# Patient Record
Sex: FEMALE | Race: WHITE | NOT HISPANIC OR LATINO | URBAN - METROPOLITAN AREA
[De-identification: names, ages, dates, MRNs, and addresses within clinical notes are randomized per-mention and may not be internally consistent; named-entity substitution may affect disease eponyms.]

---

## 2023-06-14 LAB
ALANINE AMINOTRANSFERASE (SGPT) (U/L) IN SER/PLAS: 22 U/L (ref 7–45)
ALBUMIN (G/DL) IN SER/PLAS: 4.7 G/DL (ref 3.4–5)
ALKALINE PHOSPHATASE (U/L) IN SER/PLAS: 76 U/L (ref 33–110)
ANION GAP IN SER/PLAS: 15 MMOL/L (ref 10–20)
ASPARTATE AMINOTRANSFERASE (SGOT) (U/L) IN SER/PLAS: 20 U/L (ref 9–39)
BILIRUBIN TOTAL (MG/DL) IN SER/PLAS: 0.7 MG/DL (ref 0–1.2)
CALCIUM (MG/DL) IN SER/PLAS: 10 MG/DL (ref 8.6–10.6)
CARBON DIOXIDE, TOTAL (MMOL/L) IN SER/PLAS: 27 MMOL/L (ref 21–32)
CHLORIDE (MMOL/L) IN SER/PLAS: 101 MMOL/L (ref 98–107)
CHOLESTEROL (MG/DL) IN SER/PLAS: 197 MG/DL (ref 0–199)
CHOLESTEROL IN HDL (MG/DL) IN SER/PLAS: 67.4 MG/DL
CHOLESTEROL/HDL RATIO: 2.9
CREATININE (MG/DL) IN SER/PLAS: 0.77 MG/DL (ref 0.5–1.05)
ERYTHROCYTE DISTRIBUTION WIDTH (RATIO) BY AUTOMATED COUNT: 12.3 % (ref 11.5–14.5)
ERYTHROCYTE MEAN CORPUSCULAR HEMOGLOBIN CONCENTRATION (G/DL) BY AUTOMATED: 32.5 G/DL (ref 32–36)
ERYTHROCYTE MEAN CORPUSCULAR VOLUME (FL) BY AUTOMATED COUNT: 90 FL (ref 80–100)
ERYTHROCYTES (10*6/UL) IN BLOOD BY AUTOMATED COUNT: 5.02 X10E12/L (ref 4–5.2)
GFR FEMALE: >90 ML/MIN/1.73M2
GLUCOSE (MG/DL) IN SER/PLAS: 86 MG/DL (ref 74–99)
HEMATOCRIT (%) IN BLOOD BY AUTOMATED COUNT: 45.2 % (ref 36–46)
HEMOGLOBIN (G/DL) IN BLOOD: 14.7 G/DL (ref 12–16)
LDL: 111 MG/DL (ref 0–99)
LEUKOCYTES (10*3/UL) IN BLOOD BY AUTOMATED COUNT: 7 X10E9/L (ref 4.4–11.3)
NRBC (PER 100 WBCS) BY AUTOMATED COUNT: 0 /100 WBC (ref 0–0)
PLATELETS (10*3/UL) IN BLOOD AUTOMATED COUNT: 307 X10E9/L (ref 150–450)
POTASSIUM (MMOL/L) IN SER/PLAS: 4.4 MMOL/L (ref 3.5–5.3)
PROTEIN TOTAL: 7.1 G/DL (ref 6.4–8.2)
SODIUM (MMOL/L) IN SER/PLAS: 139 MMOL/L (ref 136–145)
THYROTROPIN (MIU/L) IN SER/PLAS BY DETECTION LIMIT <= 0.05 MIU/L: 1.37 MIU/L (ref 0.44–3.98)
THYROXINE (T4) (UG/DL) IN SER/PLAS: 7.9 UG/DL (ref 4.5–11.1)
TRIGLYCERIDE (MG/DL) IN SER/PLAS: 94 MG/DL (ref 0–149)
UREA NITROGEN (MG/DL) IN SER/PLAS: 11 MG/DL (ref 6–23)
VLDL: 19 MG/DL (ref 0–40)

## 2024-02-21 DIAGNOSIS — F41.9 ANXIETY: Primary | ICD-10-CM

## 2024-02-21 RX ORDER — MEDROXYPROGESTERONE ACETATE 10 MG/1
10 TABLET ORAL EVERY 24 HOURS
COMMUNITY
Start: 2023-05-01

## 2024-02-21 RX ORDER — ESCITALOPRAM OXALATE 5 MG/1
5 TABLET, FILM COATED ORAL DAILY
COMMUNITY
Start: 2022-06-08 | End: 2024-02-21 | Stop reason: SDUPTHER

## 2024-02-21 RX ORDER — ESCITALOPRAM OXALATE 5 MG/1
5 TABLET, FILM COATED ORAL DAILY
Qty: 90 TABLET | Refills: 1 | Status: SHIPPED | OUTPATIENT
Start: 2024-02-21

## 2024-02-21 RX ORDER — MULTIVITAMIN
1 TABLET ORAL DAILY
COMMUNITY

## 2024-02-21 RX ORDER — LEVONORGESTREL / ETHINYL ESTRADIOL AND ETHINYL ESTRADIOL 150-30(84)
1 KIT ORAL DAILY
COMMUNITY
Start: 2021-11-03

## 2024-06-19 ENCOUNTER — APPOINTMENT (OUTPATIENT)
Dept: PRIMARY CARE | Facility: CLINIC | Age: 29
End: 2024-06-19
Payer: COMMERCIAL

## 2024-06-19 VITALS
WEIGHT: 135.4 LBS | OXYGEN SATURATION: 99 % | HEIGHT: 62 IN | SYSTOLIC BLOOD PRESSURE: 138 MMHG | BODY MASS INDEX: 24.92 KG/M2 | DIASTOLIC BLOOD PRESSURE: 82 MMHG | HEART RATE: 102 BPM | TEMPERATURE: 97.7 F

## 2024-06-19 DIAGNOSIS — G43.909 MIGRAINE WITHOUT STATUS MIGRAINOSUS, NOT INTRACTABLE, UNSPECIFIED MIGRAINE TYPE: ICD-10-CM

## 2024-06-19 DIAGNOSIS — Z11.4 ENCOUNTER FOR SCREENING FOR HIV: ICD-10-CM

## 2024-06-19 DIAGNOSIS — Z11.59 NEED FOR HEPATITIS C SCREENING TEST: ICD-10-CM

## 2024-06-19 DIAGNOSIS — F41.9 ANXIETY: ICD-10-CM

## 2024-06-19 DIAGNOSIS — I47.19 ATRIAL PAROXYSMAL TACHYCARDIA (CMS-HCC): ICD-10-CM

## 2024-06-19 DIAGNOSIS — N97.9 INABILITY TO CONCEIVE, FEMALE: ICD-10-CM

## 2024-06-19 DIAGNOSIS — R79.89 HIGH SERUM LOW DENSITY LIPOPROTEIN (LDL) CHOLESTEROL: Primary | ICD-10-CM

## 2024-06-19 DIAGNOSIS — N92.6 IRREGULAR MENSTRUAL CYCLE: ICD-10-CM

## 2024-06-19 PROBLEM — G43.109 OCULAR MIGRAINE: Status: ACTIVE | Noted: 2024-06-19

## 2024-06-19 PROBLEM — L30.9 ECZEMA: Status: ACTIVE | Noted: 2024-06-19

## 2024-06-19 PROBLEM — E78.5 HYPERLIPIDEMIA: Status: RESOLVED | Noted: 2024-06-19 | Resolved: 2024-06-19

## 2024-06-19 PROBLEM — E78.5 HYPERLIPIDEMIA: Status: ACTIVE | Noted: 2024-06-19

## 2024-06-19 PROCEDURE — 99395 PREV VISIT EST AGE 18-39: CPT | Performed by: INTERNAL MEDICINE

## 2024-06-19 PROCEDURE — 1036F TOBACCO NON-USER: CPT | Performed by: INTERNAL MEDICINE

## 2024-06-19 RX ORDER — ESCITALOPRAM OXALATE 5 MG/1
5 TABLET ORAL DAILY
Qty: 90 TABLET | Refills: 3 | Status: SHIPPED | OUTPATIENT
Start: 2024-06-19

## 2024-06-19 SDOH — HEALTH STABILITY: PHYSICAL HEALTH: ON AVERAGE, HOW MANY MINUTES DO YOU ENGAGE IN EXERCISE AT THIS LEVEL?: 30 MIN

## 2024-06-19 SDOH — ECONOMIC STABILITY: TRANSPORTATION INSECURITY
IN THE PAST 12 MONTHS, HAS LACK OF TRANSPORTATION KEPT YOU FROM MEETINGS, WORK, OR FROM GETTING THINGS NEEDED FOR DAILY LIVING?: NO

## 2024-06-19 SDOH — HEALTH STABILITY: PHYSICAL HEALTH: ON AVERAGE, HOW MANY DAYS PER WEEK DO YOU ENGAGE IN MODERATE TO STRENUOUS EXERCISE (LIKE A BRISK WALK)?: 4 DAYS

## 2024-06-19 SDOH — ECONOMIC STABILITY: TRANSPORTATION INSECURITY
IN THE PAST 12 MONTHS, HAS THE LACK OF TRANSPORTATION KEPT YOU FROM MEDICAL APPOINTMENTS OR FROM GETTING MEDICATIONS?: NO

## 2024-06-19 ASSESSMENT — PATIENT HEALTH QUESTIONNAIRE - PHQ9
SUM OF ALL RESPONSES TO PHQ9 QUESTIONS 1 & 2: 0
2. FEELING DOWN, DEPRESSED OR HOPELESS: NOT AT ALL
1. LITTLE INTEREST OR PLEASURE IN DOING THINGS: NOT AT ALL

## 2024-06-19 ASSESSMENT — LIFESTYLE VARIABLES
HOW OFTEN DO YOU HAVE A DRINK CONTAINING ALCOHOL: NEVER
SKIP TO QUESTIONS 9-10: 1
HOW MANY STANDARD DRINKS CONTAINING ALCOHOL DO YOU HAVE ON A TYPICAL DAY: PATIENT DOES NOT DRINK
HOW OFTEN DO YOU HAVE SIX OR MORE DRINKS ON ONE OCCASION: NEVER
AUDIT-C TOTAL SCORE: 0

## 2024-06-19 ASSESSMENT — PAIN SCALES - GENERAL: PAINLEVEL: 0-NO PAIN

## 2024-06-19 NOTE — PROGRESS NOTES
"Chief Complaint   Patient presents with    Annual Exam     Pt here for AWV       28 y.o. for AWV  Last visit 06/2023. Home sbp around 130. Last checked about few months ago.   Palpitations ok.   Trying to conceive for 2 years. Irregular menses. More predictable last few cycles.   Did see Dr Malave-wanted to do a test but had to be times with menses. Menses irregular so makes it more difficult.       PAST MEDICAL HISTORY  Eczema.   Anxiety  Palpitations, tachycardia zio patch 2021  Migraines, ocular migraine (rare ocular migraine)      Allergy: wellbutrin, adverse reaction- palpitations     SOCIAL HISTORY  nonsmoker  . no children.   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     FAMILY HISTORY  mother alive Hashimoto's thyroidism, diverticulitis, basal cell ca.   Father alive depression.    Blood pressure 138/82, pulse 102, temperature 36.5 °C (97.7 °F), height 1.562 m (5' 1.5\"), weight 61.4 kg (135 lb 6.4 oz), SpO2 99%.  Body mass index is 25.17 kg/m².    Physical Examination  General: NAD. Alert.   HEENT: Normocephalic atraumatic.    Eyes: no scleral icterus. Extraocular movements intact.  Pupils equal round and reactive to light.  Ears: TM intact.  No cerumen. Hearing grossly intact.   Throat: No exudate  Neck:  Supple. No thyroid goiter.  Lymph nodes: No cervical or clavicular adenopathy  Cardiovascular: Regular rate rhythm S1-S2 normal no murmur. No carotid bruit.   Lungs: Clear to Auscultation without wheezing, rales, or rhonchi, Breath sounds symmetric. No use of accessory muscles  Abdomen:  Normoactive bowel sounds, soft, non-tender. No mass.Extremities: No pretibial edema  Neuro: no facial asymmetry.  Skin: no rash noted    No results found for: \"HGBA1C\"  Lab Results   Component Value Date    GLUCOSE 86 06/14/2023    CALCIUM 10.0 06/14/2023     06/14/2023    K 4.4 06/14/2023    CO2 27 06/14/2023     06/14/2023    BUN 11 06/14/2023    CREATININE " "0.77 06/14/2023     Lab Results   Component Value Date    ALT 22 06/14/2023    AST 20 06/14/2023    ALKPHOS 76 06/14/2023    BILITOT 0.7 06/14/2023     Lab Results   Component Value Date    WBC 7.0 06/14/2023    HGB 14.7 06/14/2023    HCT 45.2 06/14/2023    MCV 90 06/14/2023     06/14/2023     Lab Results   Component Value Date    CHOL 197 06/14/2023     Lab Results   Component Value Date    HDL 67.4 06/14/2023     No results found for: \"LDLCALC\"  Lab Results   Component Value Date    TRIG 94 06/14/2023     No components found for: \"CHOLHDL\"      ASSESSMENT/PLAN  AWV  Living Will: advised.   Cognition/Memory if 65 or above n/a  Hepatitis C (18-79) ordered  HIV (18-64, once)  ordered  Women: mammogram: n/a  PAP per gyn Dr Malave  Dexa: start age 65  Colon cancer screening 45 and older: n/a  Immunization: recalls tdap within last 1-2 years.   Depression:  denies    1. High serum low density lipoprotein (LDL) cholesterol  - Comprehensive Metabolic Panel; Future  - Lipid Panel; Future    2. Irregular menstrual cycle  - Referral to Reproductive Endocrinology; Future    3. Inability to conceive, female  - Referral to Reproductive Endocrinology; Future    4. Migraine without status migrainosus, not intractable, unspecified migraine type  - Comprehensive Metabolic Panel; Future  - CBC; Future    5. Need for hepatitis C screening test  - Hepatitis C antibody; Future    6. Encounter for screening for HIV  - HIV 1/2 Antigen/Antibody Screen with Reflex to Confirmation; Future    7. Atrial paroxysmal tachycardia (CMS-HCC)  Denies symptoms  - Comprehensive Metabolic Panel; Future  - CBC; Future    8. Anxiety  Controlled on medication  - escitalopram (Lexapro) 5 mg tablet; Take 1 tablet (5 mg) by mouth once daily.  Dispense: 90 tablet; Refill: 3        Current Outpatient Medications on File Prior to Visit   Medication Sig Dispense Refill    cetirizine (ZyrTEC) 10 mg capsule Take 1 capsule (10 mg) by mouth once daily.   "    escitalopram (Lexapro) 5 mg tablet TAKE 1 TABLET BY MOUTH EVERY DAY 90 tablet 1    multivitamin (Daily Multi-Vitamin) tablet Take 1 tablet by mouth once daily.      [DISCONTINUED] L norgest/e.estradioL-e.estrad (Seasonique) 0.15 mg-30 mcg (84)/10 mcg (7) tablets,dose pack,3 month tablet Take 1 tablet by mouth once daily.      [DISCONTINUED] Lexapro 5 mg tablet Take 1 tablet (5 mg) by mouth once daily. (Patient not taking: Reported on 6/19/2024) 90 tablet 1    [DISCONTINUED] Provera 10 mg tablet Take 1 tablet (10 mg) by mouth once every 24 hours.       No current facility-administered medications on file prior to visit.

## 2024-09-06 ENCOUNTER — LAB (OUTPATIENT)
Dept: LAB | Facility: LAB | Age: 29
End: 2024-09-06
Payer: COMMERCIAL

## 2024-09-06 DIAGNOSIS — Z11.59 NEED FOR HEPATITIS C SCREENING TEST: ICD-10-CM

## 2024-09-06 DIAGNOSIS — G43.909 MIGRAINE WITHOUT STATUS MIGRAINOSUS, NOT INTRACTABLE, UNSPECIFIED MIGRAINE TYPE: ICD-10-CM

## 2024-09-06 DIAGNOSIS — Z11.4 ENCOUNTER FOR SCREENING FOR HIV: ICD-10-CM

## 2024-09-06 DIAGNOSIS — I47.19 ATRIAL PAROXYSMAL TACHYCARDIA (CMS-HCC): ICD-10-CM

## 2024-09-06 DIAGNOSIS — R79.89 HIGH SERUM LOW DENSITY LIPOPROTEIN (LDL) CHOLESTEROL: ICD-10-CM

## 2024-09-06 LAB
ALBUMIN SERPL BCP-MCNC: 4.3 G/DL (ref 3.4–5)
ALP SERPL-CCNC: 60 U/L (ref 33–110)
ALT SERPL W P-5'-P-CCNC: 15 U/L (ref 7–45)
ANION GAP SERPL CALC-SCNC: 9 MMOL/L (ref 10–20)
AST SERPL W P-5'-P-CCNC: 14 U/L (ref 9–39)
BILIRUB SERPL-MCNC: 0.6 MG/DL (ref 0–1.2)
BUN SERPL-MCNC: 11 MG/DL (ref 6–23)
CALCIUM SERPL-MCNC: 9.1 MG/DL (ref 8.6–10.3)
CHLORIDE SERPL-SCNC: 103 MMOL/L (ref 98–107)
CHOLEST SERPL-MCNC: 164 MG/DL (ref 0–199)
CHOLESTEROL/HDL RATIO: 2.8
CO2 SERPL-SCNC: 29 MMOL/L (ref 21–32)
CREAT SERPL-MCNC: 0.77 MG/DL (ref 0.5–1.05)
EGFRCR SERPLBLD CKD-EPI 2021: >90 ML/MIN/1.73M*2
ERYTHROCYTE [DISTWIDTH] IN BLOOD BY AUTOMATED COUNT: 12 % (ref 11.5–14.5)
GLUCOSE SERPL-MCNC: 98 MG/DL (ref 74–99)
HCT VFR BLD AUTO: 41.2 % (ref 36–46)
HCV AB SER QL: NONREACTIVE
HDLC SERPL-MCNC: 57.6 MG/DL
HGB BLD-MCNC: 13.6 G/DL (ref 12–16)
HIV 1+2 AB+HIV1 P24 AG SERPL QL IA: NONREACTIVE
LDLC SERPL CALC-MCNC: 96 MG/DL
MCH RBC QN AUTO: 29.6 PG (ref 26–34)
MCHC RBC AUTO-ENTMCNC: 33 G/DL (ref 32–36)
MCV RBC AUTO: 90 FL (ref 80–100)
NON HDL CHOLESTEROL: 106 MG/DL (ref 0–149)
NRBC BLD-RTO: 0 /100 WBCS (ref 0–0)
PLATELET # BLD AUTO: 284 X10*3/UL (ref 150–450)
POTASSIUM SERPL-SCNC: 4.5 MMOL/L (ref 3.5–5.3)
PROT SERPL-MCNC: 6.8 G/DL (ref 6.4–8.2)
RBC # BLD AUTO: 4.6 X10*6/UL (ref 4–5.2)
SODIUM SERPL-SCNC: 136 MMOL/L (ref 136–145)
TRIGL SERPL-MCNC: 52 MG/DL (ref 0–149)
VLDL: 10 MG/DL (ref 0–40)
WBC # BLD AUTO: 7.8 X10*3/UL (ref 4.4–11.3)

## 2024-09-06 PROCEDURE — 80061 LIPID PANEL: CPT

## 2024-09-06 PROCEDURE — 85027 COMPLETE CBC AUTOMATED: CPT

## 2024-09-06 PROCEDURE — 36415 COLL VENOUS BLD VENIPUNCTURE: CPT

## 2024-09-06 PROCEDURE — 87389 HIV-1 AG W/HIV-1&-2 AB AG IA: CPT

## 2024-09-06 PROCEDURE — 86803 HEPATITIS C AB TEST: CPT

## 2024-09-06 PROCEDURE — 80053 COMPREHEN METABOLIC PANEL: CPT

## 2024-09-10 ENCOUNTER — TELEPHONE (OUTPATIENT)
Dept: PRIMARY CARE | Facility: CLINIC | Age: 29
End: 2024-09-10
Payer: COMMERCIAL

## 2024-09-10 NOTE — TELEPHONE ENCOUNTER
Called pt and left detailed message regarding results.  Encouraged to contact clinical with any questions.

## 2024-09-10 NOTE — TELEPHONE ENCOUNTER
----- Message from Malaika Mabry sent at 9/9/2024  5:13 PM EDT -----  Cholesterol 164. Goal is 200 or less. Rest of labs within goal.

## 2024-09-26 ENCOUNTER — TELEPHONE (OUTPATIENT)
Dept: ENDOCRINOLOGY | Facility: CLINIC | Age: 29
End: 2024-09-26
Payer: COMMERCIAL

## 2024-09-26 ASSESSMENT — LIFESTYLE VARIABLES
TOBACCO_USE: NO
HISTORY_ALCOHOL_USE: NO

## 2024-09-26 NOTE — TELEPHONE ENCOUNTER
I called patient to see if she could set her Mychart up. She didn't answer but I did leave her a voicemail. I stated that I would need her to set her mychart up so that we can send her a questionnaire for her appointment and can send lab results.        Gini Cordon MA

## 2024-09-27 ENCOUNTER — ANCILLARY PROCEDURE (OUTPATIENT)
Dept: ENDOCRINOLOGY | Facility: CLINIC | Age: 29
End: 2024-09-27
Payer: COMMERCIAL

## 2024-09-27 ENCOUNTER — PATIENT MESSAGE (OUTPATIENT)
Dept: ENDOCRINOLOGY | Facility: CLINIC | Age: 29
End: 2024-09-27
Payer: COMMERCIAL

## 2024-09-27 ENCOUNTER — CONSULT (OUTPATIENT)
Dept: ENDOCRINOLOGY | Facility: CLINIC | Age: 29
End: 2024-09-27
Payer: COMMERCIAL

## 2024-09-27 VITALS
SYSTOLIC BLOOD PRESSURE: 118 MMHG | BODY MASS INDEX: 25.54 KG/M2 | WEIGHT: 135.25 LBS | HEART RATE: 99 BPM | DIASTOLIC BLOOD PRESSURE: 80 MMHG | HEIGHT: 61 IN

## 2024-09-27 DIAGNOSIS — Z11.3 SCREENING FOR STDS (SEXUALLY TRANSMITTED DISEASES): ICD-10-CM

## 2024-09-27 DIAGNOSIS — Z31.41 FERTILITY TESTING: ICD-10-CM

## 2024-09-27 DIAGNOSIS — Z01.812 ENCOUNTER FOR PREPROCEDURAL LABORATORY EXAMINATION: ICD-10-CM

## 2024-09-27 DIAGNOSIS — Z11.59 ENCOUNTER FOR SCREENING FOR OTHER VIRAL DISEASES: ICD-10-CM

## 2024-09-27 DIAGNOSIS — Z01.83 ENCOUNTER FOR RH BLOOD TYPING: ICD-10-CM

## 2024-09-27 DIAGNOSIS — N97.9 FEMALE INFERTILITY: Primary | ICD-10-CM

## 2024-09-27 DIAGNOSIS — N97.9 INABILITY TO CONCEIVE, FEMALE: ICD-10-CM

## 2024-09-27 DIAGNOSIS — N92.6 IRREGULAR MENSTRUAL CYCLE: ICD-10-CM

## 2024-09-27 DIAGNOSIS — Z13.29 SCREENING FOR THYROID DISORDER: ICD-10-CM

## 2024-09-27 DIAGNOSIS — Z13.71 SCREENING FOR GENETIC DISEASE CARRIER STATUS: ICD-10-CM

## 2024-09-27 LAB
ABO GROUP (TYPE) IN BLOOD: NORMAL
ANTIBODY SCREEN: NORMAL
HBV SURFACE AG SERPL QL IA: NONREACTIVE
RH FACTOR (ANTIGEN D): NORMAL
TSH SERPL-ACNC: 1.48 MIU/L (ref 0.44–3.98)

## 2024-09-27 PROCEDURE — 84443 ASSAY THYROID STIM HORMONE: CPT

## 2024-09-27 PROCEDURE — 86900 BLOOD TYPING SEROLOGIC ABO: CPT

## 2024-09-27 PROCEDURE — 86901 BLOOD TYPING SEROLOGIC RH(D): CPT

## 2024-09-27 PROCEDURE — 87340 HEPATITIS B SURFACE AG IA: CPT

## 2024-09-27 PROCEDURE — 86787 VARICELLA-ZOSTER ANTIBODY: CPT

## 2024-09-27 PROCEDURE — 83516 IMMUNOASSAY NONANTIBODY: CPT

## 2024-09-27 PROCEDURE — 86317 IMMUNOASSAY INFECTIOUS AGENT: CPT

## 2024-09-27 PROCEDURE — 99204 OFFICE O/P NEW MOD 45 MIN: CPT

## 2024-09-27 PROCEDURE — 87591 N.GONORRHOEAE DNA AMP PROB: CPT

## 2024-09-27 PROCEDURE — 86850 RBC ANTIBODY SCREEN: CPT

## 2024-09-27 PROCEDURE — 87491 CHLMYD TRACH DNA AMP PROBE: CPT

## 2024-09-27 PROCEDURE — 86780 TREPONEMA PALLIDUM: CPT

## 2024-09-27 PROCEDURE — 99214 OFFICE O/P EST MOD 30 MIN: CPT

## 2024-09-27 ASSESSMENT — PATIENT HEALTH QUESTIONNAIRE - PHQ9
1. LITTLE INTEREST OR PLEASURE IN DOING THINGS: NOT AT ALL
2. FEELING DOWN, DEPRESSED OR HOPELESS: NOT AT ALL
SUM OF ALL RESPONSES TO PHQ9 QUESTIONS 1 AND 2: 0

## 2024-09-27 ASSESSMENT — COLUMBIA-SUICIDE SEVERITY RATING SCALE - C-SSRS
1. IN THE PAST MONTH, HAVE YOU WISHED YOU WERE DEAD OR WISHED YOU COULD GO TO SLEEP AND NOT WAKE UP?: NO
2. HAVE YOU ACTUALLY HAD ANY THOUGHTS OF KILLING YOURSELF?: NO
6. HAVE YOU EVER DONE ANYTHING, STARTED TO DO ANYTHING, OR PREPARED TO DO ANYTHING TO END YOUR LIFE?: NO

## 2024-09-27 ASSESSMENT — LIFESTYLE VARIABLES
TOBACCO_USE: NO
HISTORY_ALCOHOL_USE: NO

## 2024-09-27 ASSESSMENT — PAIN SCALES - GENERAL: PAINLEVEL: 0-NO PAIN

## 2024-09-27 NOTE — PROGRESS NOTES
Visit Type: In Person    NEW FERTILITY PATIENT VISIT    Referred by: Primary Doctor, Malaika Huntley    Accompanied today by: N/A     Joie Craig is a 28 y.o.  female who presents with primary infertility x 2.5 yrs, possible male factor and spouse with ED, wants to pursue fertility testing and treatment.        Have you had any concerns about your fertility treatments so far? Only had basic bloodwork done so far. OBGYN recommended hysterosalpingogram but when I called to schedule was told that the hospital didn't perform that test any more.     What are you goals for today's visit? Have been trying to conceive for about 2.5 years with no success. Would like figure out what tests need to be done for me or my  (he's unable to come today).     What causes of infertility have been identified on your workup so far? Irregular periods (though now more regular than prior). Not thru workup but  unable to ejaculate     Past Infertility Treatments: No      Please summarize your fertility treatments to date.       As far as you are aware, do you have insurance coverage for fertility diagnostic testing and/or fertility treatments? Yes      PRIOR EVALUATION / TREATMENT: no    Hysterosalpingogram: no  Saline Infused Sonography: no  GYN Pelvic Ultrasound: no    Prior Labs  Lab Results    Date Done      AMH: No results found for requested labs within last 1825 days. No results found for requested labs within last 1825 days.   TSH: 1.37 (Ref range: 0.44 - 3.98 mIU/L) 2023   PRL: No results found for requested labs within last 1825 days. No results found for requested labs within last 1825 days.   Testosterone: No results found for requested labs within last 1825 days. No results found for requested labs within last 1825 days.   DHEAS: No results found for requested labs within last 1825 days. No results found for requested labs within last 1825 days.   FSH: No results found for requested labs within  last 1825 days. No results found for requested labs within last 1825 days.   17 OHP: No results found for requested labs within last 1825 days. No results found for requested labs within last 1825 days.   HgbA1c: No results found for requested labs within last 1825 days. No results found for requested labs within last 1825 days.   Hepatitis B surface antigen: No results found for requested labs within last 1825 days. No results found for requested labs within last 1825 days.   Hepatitis C antibody: Nonreactive (Ref range: Nonreactive) 9/6/2024   HIV ½ Antigen Antibody screen with reflex: Nonreactive (Ref range: Nonreactive) 9/6/2024   Syphilis screening with reflex: No results found for requested labs within last 1825 days. No results found for requested labs within last 1825 days.   GC: No results found for requested labs within last 1825 days. No results found for requested labs within last 1825 days.   CT: No results found for requested labs within last 1825 days. No results found for requested labs within last 1825 days.   Type and Screen: No results found for requested labs within last 1825 days. No results found for requested labs within last 1825 days.   Rh: No results found for requested labs within last 1825 days. No results found for requested labs within last 1825 days.   Antibody: No results found for requested labs within last 1825 days. No results found for requested labs within last 1825 days.   Rubella: No results found for requested labs within last 1825 days. No results found for requested labs within last 1825 days.   Varicella: No results found for requested labs within last 1825 days. No results found for requested labs within last 1825 days.   Hemoglobin: No results found for requested labs within last 1825 days. No results found for requested labs within last 1825 days.   Hematocrit: No results found for requested labs within last 1825 days. No results found for requested labs within last 1825  days.   Creatinine: 0.77 (Ref range: 0.50 - 1.05 mg/dL) 2024   AST:14 (Ref range: 9 - 39 U/L) 2024   ALT:15 (Ref range: 7 - 45 U/L): 2024     Relationship Status:  , 6 yrs    Have you ever been pregnant? No  How many times have you been pregnant?    Have you ever had a miscarriage? No  How many times have you had a miscarriage?       OB Hx:      OB History          0    Para   0    Term   0       0    AB   0    Living   0         SAB   0    IAB   0    Ectopic   0    Multiple   0    Live Births   0                 GYN HISTORY   Have you ever been diagnosed with a sexually transmitted disease? No  Have you ever had Pelvic Inflammatory Disease? No  Have you had an abnormal PAP smear? No  Date & Result of last PAP smear: 2021- negative  Have you ever had an abnormal Mammogram? No  Date & result of your last mammogram:  NA  Do you have pelvic pain? No  How many times per week do you have intercourse? 1-2 times during fertile window  Do you have pain with intercourse? No  Do you use lubricants with intercourse? None  Do you have pain with bowel movements? No  Do you have pain with a full bladder? No    MENSTRUAL HISTORY  LMP: 2024  Menarche: ~age 11  Contraception: OCP for heavy menses as teen x 8 yrs, stopped due to migraines with aura  Cycle length: every 31  Describe your bleedin days Average  Dysmenorrhea: no     ENDOCRINE/INFERTILITY HISTORY  Duration of infertility: 2.5 yrs  Coital Activity/week: 1-2 times during fertile window  Nipple Discharge: No  Vision changes: No  Headaches: yes-  had migraines with aura on OCPs, has had some HA's off OCP also, but not with aura, Excess hair growth: No  Excessive hair loss: No  Acne: No  Oily skin: No  Recent weight change  Weight gain: No  Weight loss: No  Exercise more than 3 times a week: No      PMH  Past Medical History:   Diagnosis Date    Anxiety     Irregular menstrual cycle     Migraines         MEDICATIONS  Current  Outpatient Medications on File Prior to Visit   Medication Sig Dispense Refill    cetirizine (ZyrTEC) 10 mg capsule Take 1 capsule (10 mg) by mouth once daily.      escitalopram (Lexapro) 5 mg tablet Take 1 tablet (5 mg) by mouth once daily. 90 tablet 3    multivitamin (Daily Multi-Vitamin) tablet Take 1 tablet by mouth once daily.       No current facility-administered medications on file prior to visit.        PSH  Past Surgical History:   Procedure Laterality Date    OTHER SURGICAL HISTORY  2021    [place] tooth extraction        PSYCH HISTORY  Have you ever been diagnosed with a mental health Issue? Yes  Have you ever been hospitalized for a mental health disorder? 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 No  Do you have a history of domestic violence? No  Do you feel safe at home? Yes  Do you have a history of any negative sexual experience such as incest or rape? No       PARTNER HISTORY  Partner Name: Chapo Craig  Partner : [id number]  Partner email: [email or web address]  Occupation:   Prior fertility history: No children. Decreased ability to be sexually stimulated. Difficult and usually unable to ejaculate. Difficulty maintaining erection. Inability to orgasm. Decreased sex drive.  Decreased pleasurable sensation of penis during sex with unable to ejaculate.  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    CANCER HISTORY    Breast: No  Ovarian: No  Colon: No  Endometrial: No    FAMILY VTE HISTORY  Family History of Blood Clots: No    GENETIC HISTORY  Ethnic Background  Patient: White  Partner: White  Genetic Disease in Family  Patient: No  Partner: No  Birth Defects in Family  Patient: No  Partner: No    Genetic screening performed previously:  no     BMI:   BMI Readings from Last 1 Encounters:   24 25.56 kg/m²     VITALS:  /80   Pulse 99   Ht 1.549 m (5' 1\")   Wt 61.3 kg (135 lb 4 oz)   LMP 2024   BMI 25.56 kg/m²     ASSESSMENT   28 y.o.  female with  primary infertility x 2.5, suspected ovulation and the following pertinent medical issues: male factor, spouse with ED, no formal urology evaluation done .    Partner SA: No Assessment    COUNSELING  We discussed causes of infertility including hormonal, egg quality issues, structural problems such as endometriosis, adhesions, or tubal problems, uterine factors such as polyps or fibroids, and sperm issues. Reviewed evaluation of such as well. We discussed various methods for achieving pregnancy in some detail including, ovulation induction, insemination, superovulation and IVF.    We discussed AMH testing and the patient's AMH level, if applicable.  AMH is considered favorable if >1 however this test has many limitations including poor predictive rates of pregnancy. In randomized control trials such as \"Time to conceive\" pts with low AMH levels (<0.7) has similar cumulative pregnancy rates compared with women that had normal AMH levels.  In the \"AMIGOS\" study, AMH did not predict pregnancy rates following OS/IUI for unexplained infertility. However,  AMH is a marker that is helpful to predict how a patient may respond or oocyte yields with FSH and ART treatments, but again there is conflicting data about how " this affects pregnancy rates with ART.    Routine Testing  Fertility Center  STDs Within 1 year   Genetic carrier Waiver/Completed   T&S Within 1 year   AMH Within 1 year   TSH Within 1 year   Rubella/Varicella Within 5 years     PLAN  AMH  T&S  TSH  STDs  Rubella/Varicella  [facility]  Pelvic Ultrasound- call CD 1 next menses to schedule  HSG- call CD 1 next menses to schedule  Schedule IVF Consult  Chart to primary nurse for care coordination and patient check list/education  Enroll in Engaged MD  start prenatal vitamins, vitamin D 2000 IUs daily  Discussed that pap and mammogram must be updated per ACOG guidelines before treatment can begin- schedule pap test, due now  Discussed that treatment cannot proceed until checklist items are complete   6- 8 virtual visit with me   Additional testing for BMI < 18 or > 40: NA    MD Completion:  Ectopic Risk: No  Medically Complex: No    Partner:  Will see Dr. COPELAND  Will need virtual NPV if partner is a carrier for Myriad  Will need STDs for IUI or IVF, likely will need IVF- may be able to have DR. COPELAND order  Cannot ejaculate, will refer to DR. COPELAND & for SA evaluation    FOLLOW UP PLAN:   Will likely need IVF  Did briefly discuss donor sperm for donor IUI vs IVF with partner or if no sperm then donor sperm- unsure if they would use donor sperm    María Elena Nino CNP 09/27/24 10:58 AM

## 2024-09-28 LAB
C TRACH RRNA SPEC QL NAA+PROBE: NEGATIVE
N GONORRHOEA DNA SPEC QL PROBE+SIG AMP: NEGATIVE
RUBV IGG SERPL IA-ACNC: 1.4 IA
RUBV IGG SERPL QL IA: POSITIVE
TREPONEMA PALLIDUM IGG+IGM AB [PRESENCE] IN SERUM OR PLASMA BY IMMUNOASSAY: NONREACTIVE
VARICELLA ZOSTER IGG INDEX: 1.7 IA
VZV IGG SER QL IA: POSITIVE

## 2024-09-30 LAB — MIS SERPL-MCNC: 7.01 NG/ML (ref 0.18–11.71)

## 2024-10-10 ENCOUNTER — DOCUMENTATION (OUTPATIENT)
Dept: ENDOCRINOLOGY | Facility: CLINIC | Age: 29
End: 2024-10-10
Payer: COMMERCIAL

## 2024-10-10 NOTE — PROGRESS NOTES
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 Carriers generally do not experience symptoms. G869R may be associated with a late-onset form of Kristofer disease.      [   ] Genetic carrier testing reviewed and NEGATIVE result noted.    Additional actions:  [   ] Ok to proceed with next steps, no additional genetic testing or counseling recommended  [ X  ] Awaiting partner testing: spouse Chapo Craig  1994 needs virtual NPV to order Myriad screening for him  [   ] Partner testing reviewed and no concordance. Ok to proceed with planned treatments.     María Elena Nino CNP 10/10/24 1:11 AM

## 2024-10-11 LAB — COMMENTS - MP RESULT TYPE: NORMAL

## 2024-10-18 ENCOUNTER — ANCILLARY PROCEDURE (OUTPATIENT)
Dept: ENDOCRINOLOGY | Facility: CLINIC | Age: 29
End: 2024-10-18
Payer: COMMERCIAL

## 2024-10-18 DIAGNOSIS — Z31.41 FERTILITY TESTING: ICD-10-CM

## 2024-10-18 PROCEDURE — 76830 TRANSVAGINAL US NON-OB: CPT

## 2024-10-18 PROCEDURE — 76830 TRANSVAGINAL US NON-OB: CPT | Performed by: OBSTETRICS & GYNECOLOGY

## 2024-11-21 ENCOUNTER — DOCUMENTATION (OUTPATIENT)
Dept: ENDOCRINOLOGY | Facility: CLINIC | Age: 29
End: 2024-11-21
Payer: COMMERCIAL

## 2024-11-21 NOTE — PROGRESS NOTES
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 Carriers generally do not experience symptoms. G869R may be associated with a late-onset form of Kristofer disease. Spouse is negative.     [   ] Genetic carrier testing reviewed and NEGATIVE result noted.     Additional actions:  [   ] Ok to proceed with next steps, no additional genetic testing or counseling recommended  [  ] Awaiting partner testing:  [  X ] Partner testing reviewed and no concordance. Ok to proceed with planned treatments. 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 Carriers generally do not experience symptoms. 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 Carriers generally do not experience symptoms. In the absence of a known personal and/or family history of inflammatory disease, the clinical significance of MEFV mutation status is uncertain. Spouse is negative.  No additional genetic screening is necessary.   María Elena Nino, SHANNAN 24 3:40 PM

## 2024-12-08 NOTE — PROGRESS NOTES
Virtual or Telephone Consent: An interactive audio and video telecommunication system which permits real time communications between the patient (at the originating site) and provider (at the distant site) was utilized to provide this telehealth service    Follow Up Visit HPI    Patient is a 29 y.o.  female  female with primary infertility x 2.5 yrs, possible male factor and spouse with ED,  presenting today for follow up visit to discuss test results and treatment options.     PRIOR EVALUATION / TREATMENT: no     Prior Labs  Lab Results    Date Done      AMH: 7.015 (Ref range: 0.176 - 11.705 ng/mL) 2024   TSH: 1.48 (Ref range: 0.44 - 3.98 mIU/L) 2024   PRL: No results found for requested labs within last 1825 days. No results found for requested labs within last 1825 days.   Testosterone: No results found for requested labs within last 1825 days. No results found for requested labs within last 1825 days.   DHEAS: No results found for requested labs within last 1825 days. No results found for requested labs within last 1825 days.   FSH: No results found for requested labs within last 1825 days. No results found for requested labs within last 1825 days.   17 OHP: No results found for requested labs within last 1825 days. No results found for requested labs within last 1825 days.   HgbA1c: No results found for requested labs within last 1825 days. No results found for requested labs within last 1825 days.   Hepatitis B surface antigen: Nonreactive (Ref range: Nonreactive) 2024   Hepatitis C antibody: Nonreactive (Ref range: Nonreactive) 2024   HIV ½ Antigen Antibody screen with reflex: Nonreactive (Ref range: Nonreactive) 2024   Syphilis screening with reflex: Negative 2024   GC: Negative (Ref range: Negative) 2024   CT: Negative (Ref range: Negative) 2024   Type and Screen: O 2024   Rh: POS 2024   Antibody: NEG (Ref range: ) 2024   Rubella: Positive  (Ref range: Negative) 2024   Varicella: Positive (A; Ref range: Negative) 2024   Hemoglobin: No results found for requested labs within last 1825 days. No results found for requested labs within last 1825 days.   Hematocrit: No results found for requested labs within last 1825 days. No results found for requested labs within last 1825 days.   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 Carriers generally do not experience symptoms. G869R may be associated with a late-onset form of Kristofer disease. Spouse is negative.       Hysterosalpingogram: no  Saline Infused Sonography: no  GYN Pelvic Ultrasound: US PELVIS TRANSVAGINAL (10/20/2024): Anteverted uterus with an endometrial lining that measures as below. The ovaries are normal in appearance bilaterally.       Relationship Status:  , 6 yrs     Have you ever been pregnant? No  How many times have you been pregnant?    Have you ever had a miscarriage? No  How many times have you had a miscarriage?       OB Hx:      OB History            0    Para   0    Term   0       0    AB   0    Living   0           SAB   0    IAB   0    Ectopic   0    Multiple   0    Live Births   0                   GYN HISTORY   Have you ever been diagnosed with a sexually transmitted disease? No  Have you ever had Pelvic Inflammatory Disease? No  Have you had an abnormal PAP smear? No  Date & Result of last PAP smear: 2021- negative- is due- will call schedule  Have you ever had an abnormal Mammogram? No  Date & result of your last mammogram:  NA  Do you have pelvic pain? No  How many times per week do you have intercourse? 1-2 times during fertile window  Do you have pain with intercourse? No  Do you use lubricants with intercourse? None  Do you have pain with bowel movements? No  Do you have pain with a full bladder?  No     MENSTRUAL HISTORY  LMP: 2024  Cycle length: every 29-30 days  Describe your bleedin-6 days Average, 1st 3 days heavier  Dysmenorrhea: no     ENDOCRINE/INFERTILITY HISTORY  Duration of infertility: 2.5 yrs  Coital Activity/week: 1-2 times during fertile window  Nipple Discharge: No  Vision changes: No  Headaches: yes-  had migraines with aura on OCPs, has had some HA's off OCP also, but not with aura, Excess hair growth: No  Excessive hair loss: No  Acne: No  Oily skin: No  Recent weight change  Weight gain: No  Weight loss: No  Exercise more than 3 times a week: No    Past Medical History:   Diagnosis Date    Anxiety     Irregular menstrual cycle     Migraines      Past Surgical History:   Procedure Laterality Date    OTHER SURGICAL HISTORY  2021    [place] tooth extraction     Current Outpatient Medications on File Prior to Visit   Medication Sig Dispense Refill    cetirizine (ZyrTEC) 10 mg capsule Take 1 capsule (10 mg) by mouth once daily.      escitalopram (Lexapro) 5 mg tablet Take 1 tablet (5 mg) by mouth once daily. 90 tablet 3    multivitamin (Daily Multi-Vitamin) tablet Take 1 tablet by mouth once daily.       No current facility-administered medications on file prior to visit.       BMI:   BMI Readings from Last 1 Encounters:   24 25.56 kg/m²     VITALS:  There were no vitals taken for this visit.  LMP: 2024    PARTNER HISTORY  Partner Name: Chapo Craig  Partner : [id number]  Partner email: [email or web address]  Occupation:   Prior fertility history: No children. Decreased ability to be sexually stimulated. Difficult and usually unable to ejaculate. Difficulty maintaining erection. Inability to orgasm. Decreased sex drive.  Decreased pleasurable sensation of penis during sex with unable to ejaculate.  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 Carriers generally do not experience symptoms. 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 Carriers generally do not experience symptoms. In the absence of a known personal and/or family history of inflammatory disease, the clinical significance of MEFV mutation status is uncertain. Spouse is negative.  SA: Not done yet  SA Results:  NA  Scrotal Ultrasound: 2024: IMPRESSION:  Small right hydrocele with small bilateral varicoceles.  Saw DR. COPELAND on 2024: summary:  Semen analysis with strict morphology- has not done yet  Fertility Panel:  serum Estradiol, FSH + LH, serum Prolactin, Testosterone - all normal 2024     #delayed orgasm both with masturbation and sex, lifelong  -fert/ed panel  -cialis 5mg daily - med counseling done- he took x 4-5 days and stopped due to side effects, muscle aches  -Dr. Sky     Needs Fu with DR. COPELAND    ASSESSMENT   29 y.o.  female with  primary infertility x 2.5, suspected ovulation and the following pertinent medical issues: male factor/ED- spouse seen by Dr. COPELAND in Urology 2024- will follow up with DR. COPELAND for next step.       COUNSELING  We discussed causes of infertility including hormonal, egg quality issues, structural problems such as endometriosis, adhesions, or tubal problems, uterine factors such as polyps or fibroids, and sperm issues. Reviewed evaluation of such as well. We discussed various methods for achieving pregnancy in some detail including, ovulation induction, insemination, superovulation and IVF.     A complete cycle includes all  fresh and frozen-thawed embryo transfers resulting from one egg collection.  Your chance of having your first baby after the 1st complete cycle of IVF is: 67.96%. This means that out of 100 couples undertaking a 1st complete cycle, approximately 68 would have a baby.    A complete cycle includes all fresh and frozen-thawed embryo transfers resulting from one egg collection.  Your chance of having your first baby after a maximum of 2 complete cycles of IVF is: 85.99%. This means that out of 100 couples undertaking a maximum of 2 complete cycles, approximately 86 would have a baby.    Routine Testing  Fertility Center  STDs Within 1 year   Genetic carrier Waiver/Completed   T&S Within 1 year   AMH Within 1 year   TSH Within 1 year   Rubella/Varicella Within 5 years     BMI Testing  Fertility Center  CBC Within 1 year   CMP Within 1 year   HgbA1c Within 1 year   Mag, Phos, Vit D <18 Within 1 year   MFM > 40  REQ   Wt loss consult > 40 OPT     PLAN   HSG??- did not schedule- will call CD 1 to schedule- can defer if does IVF  Due for pap test- will call OB/GYN to schedule  Has IVF Consult scheduled for 12-  Engaged MD  Take prenatal vitamins, vitamin D 2000 IUs daily  Discussed that treatment cannot proceed until checklist items are complete.   Additional testing for BMI < 18 or > 40: NA.  Chart to primary nurse for care coordination and patient check list/education.    MD Completion:  Ectopic Risk: No  Medically Complex: No    Partner:  SA  See DR. COPELAND for next steps- unable to take Cialis d/t side effects    Outstanding boarding pass items:   Patient- ? HSG- can defer if does IVF  partner- SA & See DR. COPELAND for next steps- unable to take Cialis d/t side effects    FOLLOW UP PLAN:   Will likely need IVF due to spouse ED  Did briefly discuss donor sperm for donor IUI vs IVF with partner or if no sperm then donor sperm- she does not feel this is an option they would pursue      María Elena Nino CNP 12/09/24 7:27 AM

## 2024-12-09 ENCOUNTER — TELEMEDICINE (OUTPATIENT)
Dept: ENDOCRINOLOGY | Facility: CLINIC | Age: 29
End: 2024-12-09
Payer: COMMERCIAL

## 2024-12-09 DIAGNOSIS — N97.9 FEMALE INFERTILITY: Primary | ICD-10-CM

## 2024-12-09 PROCEDURE — 1036F TOBACCO NON-USER: CPT

## 2024-12-09 PROCEDURE — 99213 OFFICE O/P EST LOW 20 MIN: CPT

## 2024-12-09 RX ORDER — VIT C/E/ZN/COPPR/LUTEIN/ZEAXAN 250MG-90MG
25 CAPSULE ORAL DAILY
COMMUNITY

## 2024-12-27 ENCOUNTER — PATIENT MESSAGE (OUTPATIENT)
Dept: ENDOCRINOLOGY | Facility: CLINIC | Age: 29
End: 2024-12-27
Payer: COMMERCIAL

## 2024-12-28 ASSESSMENT — LIFESTYLE VARIABLES
CURRENT_SMOKER: NO
CURRENT_SMOKER: NO

## 2024-12-30 ENCOUNTER — CONSULT (OUTPATIENT)
Dept: ENDOCRINOLOGY | Facility: CLINIC | Age: 29
End: 2024-12-30
Payer: COMMERCIAL

## 2024-12-30 VITALS
HEART RATE: 99 BPM | DIASTOLIC BLOOD PRESSURE: 81 MMHG | WEIGHT: 138 LBS | HEIGHT: 61 IN | BODY MASS INDEX: 26.06 KG/M2 | SYSTOLIC BLOOD PRESSURE: 145 MMHG

## 2024-12-30 DIAGNOSIS — Z31.41 FERTILITY TESTING: Primary | ICD-10-CM

## 2024-12-30 DIAGNOSIS — N97.9 FEMALE INFERTILITY: ICD-10-CM

## 2024-12-30 DIAGNOSIS — Z01.812 ENCOUNTER FOR PREPROCEDURAL LABORATORY EXAMINATION: ICD-10-CM

## 2024-12-30 PROCEDURE — 99215 OFFICE O/P EST HI 40 MIN: CPT | Mod: GC | Performed by: OBSTETRICS & GYNECOLOGY

## 2024-12-30 PROCEDURE — 99215 OFFICE O/P EST HI 40 MIN: CPT | Performed by: OBSTETRICS & GYNECOLOGY

## 2024-12-30 ASSESSMENT — PAIN SCALES - GENERAL: PAINLEVEL_OUTOF10: 0-NO PAIN

## 2024-12-30 ASSESSMENT — COLUMBIA-SUICIDE SEVERITY RATING SCALE - C-SSRS
2. HAVE YOU ACTUALLY HAD ANY THOUGHTS OF KILLING YOURSELF?: NO
6. HAVE YOU EVER DONE ANYTHING, STARTED TO DO ANYTHING, OR PREPARED TO DO ANYTHING TO END YOUR LIFE?: NO
1. IN THE PAST MONTH, HAVE YOU WISHED YOU WERE DEAD OR WISHED YOU COULD GO TO SLEEP AND NOT WAKE UP?: NO

## 2024-12-30 ASSESSMENT — PATIENT HEALTH QUESTIONNAIRE - PHQ9
1. LITTLE INTEREST OR PLEASURE IN DOING THINGS: NOT AT ALL
SUM OF ALL RESPONSES TO PHQ9 QUESTIONS 1 AND 2: 0
2. FEELING DOWN, DEPRESSED OR HOPELESS: NOT AT ALL

## 2024-12-30 NOTE — PROGRESS NOTES
Visit Type: In Person    IVF Note     Patient is a 29 y.o.  female, here for IVF consult due to Infertility     Difficulty with ejaculation        Here with Spouse     Have you reviewed the  IVF PowerPoint? No      Do you have any questions after reviewing the  IVF PowerPoint?     Please explain.      Have you had any concerns about your fertility treatments so far? No     What are you goals for today's visit? Initial consult, review options     What causes of infertility have been identified on your workup so far? Male factor    Past Infertility Treatments: Yes     Please summarize your fertility treatments to date. Clomid 1 course to start period, ~1.5 years ago      LMP: Date     LMP Date: Dec 10, 2024     Menstrual cycles: Regular        Latest Reference Range & Units Most Recent   Thyroid Stimulating Hormone 0.44 - 3.98 mIU/L 1.48  24 11:28   Thyroxine 4.5 - 11.1 ug/dL 7.9  23 09:15   GLUCOSE 74 - 99 mg/dL 98  24 10:04   Anti-Mullerian Hormone 0.176 - 11.705 ng/mL 7.015  24 11:28       Status of fallopian tubes: none   Saline Ultrasound: none  Hysteroscopy: n/a    GYN HISTORY   HX of abnormal Mammo: No    Last mammogram:    HX of abnormal pap smear: No    Last pap smear: ~2 yrs ago    PMH  Past Medical History:   Diagnosis Date    Anxiety     Irregular menstrual cycle     Migraines      PT Prev Gen Scrn: Yes, I am a carrier of one or more conditions.    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    Social history  Social History     Tobacco Use    Smoking status: Never     Passive exposure: Never    Smokeless tobacco: Never   Substance Use Topics    Alcohol use: Not Currently    Drug use: Never     Current smoker: No       Family history  Patient family history:       Current Meds  Current Outpatient Medications   Medication Sig Dispense Refill    cetirizine (ZyrTEC) 10 mg capsule Take 1 capsule (10 mg) by mouth once daily.      cholecalciferol (Vitamin D-3) 25 MCG (1000 UT) capsule Take 1 capsule (25 mcg) by mouth once daily.      escitalopram (Lexapro) 5 mg tablet Take 1 tablet (5 mg) by mouth once daily. 90 tablet 3    multivitamin (Daily Multi-Vitamin) tablet Take 1 tablet by mouth once daily.       No current facility-administered medications for this visit.       Allergies  Bupropion    Partner History  Name: Al Craig  : 1994  MRN: [id number]    SA in past year: No     Part Prev Gen Scrn: Yes, my partner is a carrier of one or more conditions.    VITALS:  /81   Pulse 99   Ht 1.549 m (5' 1\")   Wt 62.6 kg (138 lb)   LMP 12/10/2024 (Exact Date)   BMI 26.07 kg/m²   BMI:   BMI Readings from Last 1 Encounters:   24 26.07 kg/m²     ASSESSMENT   29 y.o.  female with  male factor infertility x approximately 3 years, and the following pertinent medical issues: none.  Indication for IVF: Male infertility  Partner SA:  pending    Partner with ED, has a SA scheduled at [facility] on . He is meeting with Dr. COPELAND in February for possible EEJ?    We reviewed IVF and discussed the following:   In-vitro fertilization and embryo transfer  Stimulation protocols   Oocyte retrieval, risks    Cryopreservation   Assessment of fertilization   Embryo development  Statistics  ICSI/Assisted hatching   Embryo transfer and preparation    Risks of OHSS and multiple " gestation   Cancelled cycles   Use of birth control   Selective reduction   Number of embryos to transfer   Ectopic pregnancy  and miscarriage  Team based care  Informed consent procedures  Folic acid supplementation   Genetic carrier screening   PGT  Frozen tissue storage and transport process  Discussed that pap and mammogram must be updated per ACOG guidelines before treatment can begin    ART Cycle Plan    1. Protocol/Fertility Plan Update:   Lead in: OCP  Stimulation protocol: Antagonist IVF protocol ; HMG 75  Trigger plan: HCG vs Lupron  Pre-retrieval meds: Antibiotics per protocol  Adjuncts:  n/a  Notes:     2. FET:  Protocol: Programmed PO Estrace 6mg; BARRERA IM 75mg  Adjuncts:  none  OCP candidate: Yes  Notes:     3. Insemination:  Sperm source: partner versus donor?  Sperm collection method:  Pending assessment with Dr. COPELAND  Notes: Freeze all additional mature oocytes if not enough sperm.  ICSI: Yes  # of oocytes to be fertilized: all    4. Transfer:   Number of embryos to replace: 1  Stage of embryo transfer: day 5  Trial transfer needed? No    5. Cryopreservation plan  PGT: No   Freeze all? Yes  Oocyte cryopreservation:  In case more oocytes than available sperm, freeze all additional mature oocytes.    6. Patient willing to accept blood transfusion: Yes    7. RN to review chart, initiate IVF boarding pass, and assure completion of the following prior to proceeding with IVF stimulation:         Orders Placed This Encounter   Procedures    Hysteroscopy diagnostic    POCT pregnancy, urine manually resulted       STDs (Hepatitis B, Hepatitis C, HIV, Syphilis, GC/CT) for patient and partner (if applicable) to be completed within the last year (z11.3)  Genetic carrier testing: waiver or carrier screen completed with clearance documentation by provider for both patient and partner (z13.71)  Rubella and varicella to be completed within the last five years (z11.59)   TSH to be completed within the last year  (z13.29)  Type & Screen to be completed within the last year (z01.83)  AMH to be completed within the last year (z31.41)  Pre-IVF Imaging: Reference any orders placed by provider.  Cavity evaluation: hysteroscopy  Frozen sperm sample: ensure frozen partner sample (z31.41) or verify donor sperm on site prior to stimulation start date.  Verify in EMR or obtain copy of patient’s last mammogram (if applicable) and pap smear results for provider review in boarding pass.  Enroll in Engaged MD and complete annual consent forms for IVF and cryotransport agreements.  BMI checklist for BMI <18 or >40  Consults: Nursing and Financial Consult.  PAT Consult: No  Ectopic Risk: No  Medically Complex: No  Additional consults  none if not using donor sperm as a back up  and review what is in the boarding pass.    Partner will attempt ejaculation for 1/16/25 if unable to collect, will follow up with Dr. COPELAND for EEJ versus TESE    Follow up after after sperm collection to finalize the plan.    Martha Jackson  12/30/2024  11:55 AM    I reviewed the key and critical portions of the history and physical exam and/or was physically present for the key and critical portions performed by the fellow.  I reviewed the fellow's documentation and discussed the patient with the fellow.  I agree with the fellow's medical decision making as documented on the fellow's note.    Partner has had significant difficulty with sperm collection.  If unable to collect sperm via ejaculation will need surgical retrieval or electroejaculation.  Has follow up appointment with Dr. COPELAND to discuss options.    Shilpi Wei 12/30/24 4:32 PM

## 2025-01-22 ENCOUNTER — DOCUMENTATION (OUTPATIENT)
Dept: ENDOCRINOLOGY | Facility: CLINIC | Age: 30
End: 2025-01-22
Payer: COMMERCIAL

## 2025-01-22 NOTE — PROGRESS NOTES
Sent [email or web address] message to patient with next steps for IVF. BP started with most updated items listed. Consent forms sent to patient and partner to complete, confirmation of patient's last pap requested (one in chart from 4 years ago, consult note stated she had one 2 years ago), instructed patient to call on CD 1 to schedule hysteroscopy, message sent to Dr. Jackson to see if partner should schedule sperm freeze or wait for Dr. COPELAND's recommendations at appointment next month. EUGENIA cycle tab started. Patient will need IVF orders placed, nurse teaching, sperm prep order, tentative calendar and added to start calendar once timeline is established.  Lynette Herrera 01/22/25 11:50 AM

## 2025-01-26 DIAGNOSIS — F41.9 ANXIETY: ICD-10-CM

## 2025-01-27 RX ORDER — ESCITALOPRAM OXALATE 5 MG/1
5 TABLET ORAL DAILY
Qty: 90 TABLET | Refills: 1 | Status: SHIPPED | OUTPATIENT
Start: 2025-01-27

## 2025-02-20 ENCOUNTER — PREP FOR PROCEDURE (OUTPATIENT)
Dept: ENDOCRINOLOGY | Facility: CLINIC | Age: 30
End: 2025-02-20
Payer: COMMERCIAL

## 2025-02-20 RX ORDER — IBUPROFEN 200 MG
600 TABLET ORAL EVERY 6 HOURS PRN
Status: CANCELLED | OUTPATIENT
Start: 2025-02-20

## 2025-02-20 RX ORDER — ACETAMINOPHEN 325 MG/1
650 TABLET ORAL ONCE AS NEEDED
Status: CANCELLED | OUTPATIENT
Start: 2025-02-20

## 2025-02-21 ENCOUNTER — APPOINTMENT (OUTPATIENT)
Dept: ENDOCRINOLOGY | Facility: CLINIC | Age: 30
End: 2025-02-21
Payer: COMMERCIAL

## 2025-02-21 ENCOUNTER — HOSPITAL ENCOUNTER (OUTPATIENT)
Dept: ENDOCRINOLOGY | Facility: CLINIC | Age: 30
Discharge: HOME | End: 2025-02-21

## 2025-02-21 VITALS
BODY MASS INDEX: 26.06 KG/M2 | HEART RATE: 93 BPM | OXYGEN SATURATION: 99 % | TEMPERATURE: 97.5 F | WEIGHT: 138.01 LBS | DIASTOLIC BLOOD PRESSURE: 80 MMHG | HEIGHT: 61 IN | RESPIRATION RATE: 18 BRPM | SYSTOLIC BLOOD PRESSURE: 133 MMHG

## 2025-02-21 DIAGNOSIS — Z31.41 FERTILITY TESTING: ICD-10-CM

## 2025-02-21 LAB — PREGNANCY TEST URINE, POC: NEGATIVE

## 2025-02-21 PROCEDURE — 58555 HYSTEROSCOPY DX SEP PROC: CPT | Performed by: OBSTETRICS & GYNECOLOGY

## 2025-02-21 RX ORDER — IBUPROFEN 200 MG
600 TABLET ORAL EVERY 6 HOURS PRN
Status: DISCONTINUED | OUTPATIENT
Start: 2025-02-21 | End: 2025-02-22 | Stop reason: HOSPADM

## 2025-02-21 RX ORDER — ACETAMINOPHEN 325 MG/1
650 TABLET ORAL ONCE AS NEEDED
Status: DISCONTINUED | OUTPATIENT
Start: 2025-02-21 | End: 2025-02-22 | Stop reason: HOSPADM

## 2025-02-21 ASSESSMENT — PAIN SCALES - GENERAL: PAINLEVEL_OUTOF10: 0 - NO PAIN

## 2025-02-21 ASSESSMENT — PAIN - FUNCTIONAL ASSESSMENT: PAIN_FUNCTIONAL_ASSESSMENT: 0-10

## 2025-02-21 NOTE — PROGRESS NOTES
Patient ID: Joie Craig is a 29 y.o. female.    Hysteroscopy diagnostic    Date/Time: 2/21/2025 2:23 PM    Performed by: Shilpi Wei MD  Authorized by: Shilpi Wei MD    Consent:     Consent obtained:  Verbal and written    Consent given by:  Patient    Risks, benefits, and alternatives were discussed: yes      Risks discussed:  Bleeding, infection and pain  Universal protocol:     Procedure explained and questions answered to patient or proxy's satisfaction: yes      Relevant documents present and verified: yes      Test results available: yes      Imaging studies available: yes      Required blood products, implants, devices, and special equipment available: yes      Immediately prior to procedure, a time out was called: yes      Patient identity confirmed:  Verbally with patient, arm band and hospital-assigned identification number  Pre-procedure details:     Skin preparation:  Povidone-iodine  Procedure specific details:      Procedure: Diagnostic Hysteroscopy   Preop diagnosis: Fertility testing  Post op diagnosis: Same   Assistant: None    Anesthesia: None   IV: None   EBL: 3 cc  Specimens: None   Complications: None   Risks benefits and alternatives of the procedure explained to the patient and informed consent was obtained. Urine pregnancy test was performed and was negative. Time out was performed. The patient was placed in the dorsal lithotomy position and a sterile speculum was placed in the vagina. The cervix was sterilized with Betadine x3. Paracervical block with lidocaine 1% was administered.   Tenaculum: YES  Dilation: NO  The hysteroscope was placed in the cervix and advanced into the uterine cavity. Normal saline was used for distension media. Images were obtained and findings noted as below.   All instruments were then removed. Good hemostasis was noted. Patient tolerated the procedure well returned to the recovery area in stable condition. .   Findings:   Cavity: Smooth  cavity no lesions noted  Ostia: Bilateral tubal ostia visualized  Additional Notes: Normal endometrial cavity    Plans IVF    Shilpi S Eriberto 02/21/25 2:24 PM              Post-procedure details:     Procedure completion:  Tolerated well, no immediate complications

## 2025-03-04 LAB — NON-UH HIE GP HPV: NEGATIVE

## 2025-03-27 ENCOUNTER — DOCUMENTATION (OUTPATIENT)
Dept: ENDOCRINOLOGY | Facility: CLINIC | Age: 30
End: 2025-03-27
Payer: COMMERCIAL

## 2025-03-27 NOTE — PROGRESS NOTES
Called patient in regards to  message sent in about doing IUI v IVF with her partner's current sperm counts and freezes he was able to do. Left voicemail for patient to reach back out and let her know based on responses from Dr. COPELAND and Dr. Jackson at this point it would be up to her and her partner if she wanted to do an IUI v IVF. Would need to confirm with Dr. Jackson if patient needs hsg and/or follow up visit if proceeding with IUI since no IUI plan was made. IVF consult already completed, so patient should be able to move forward with IVF if that is what she would like to proceed with. Will wait to hear back from patient regarding decision.   ELMER HUBBARD on 3/27/25 at 10:32 AM.

## 2025-05-22 PROBLEM — N91.5 OLIGOMENORRHEA: Status: ACTIVE | Noted: 2023-12-01

## 2025-05-22 PROBLEM — R00.0 TACHYCARDIA: Status: ACTIVE | Noted: 2025-05-22

## 2025-06-17 ENCOUNTER — HOSPITAL ENCOUNTER (OUTPATIENT)
Dept: RADIOLOGY | Facility: HOSPITAL | Age: 30
Discharge: HOME | End: 2025-06-17

## 2025-06-17 ENCOUNTER — ANCILLARY PROCEDURE (OUTPATIENT)
Dept: ENDOCRINOLOGY | Facility: CLINIC | Age: 30
End: 2025-06-17

## 2025-06-17 DIAGNOSIS — Z01.812 ENCOUNTER FOR PREPROCEDURAL LABORATORY EXAMINATION: Primary | ICD-10-CM

## 2025-06-17 DIAGNOSIS — Z31.41 FERTILITY TESTING: ICD-10-CM

## 2025-06-17 LAB — PREGNANCY TEST URINE, POC: NEGATIVE

## 2025-06-17 PROCEDURE — 74740 X-RAY FEMALE GENITAL TRACT: CPT

## 2025-06-17 PROCEDURE — 2550000001 HC RX 255 CONTRASTS

## 2025-06-17 PROCEDURE — 58340 CATHETER FOR HYSTEROGRAPHY: CPT | Performed by: NURSE PRACTITIONER

## 2025-06-17 RX ADMIN — IOHEXOL 20 ML: 240 INJECTION, SOLUTION INTRATHECAL; INTRAVASCULAR; INTRAVENOUS; ORAL at 08:20

## 2025-06-17 NOTE — PROCEDURES
Hysterosalpingogram (HSG)    Date/Time: 6/17/2025 8:16 AM    Performed by: JINA Stanford  Authorized by: JINA Stanford    Consent:     Consent obtained:  Verbal and written    Consent given by:  Patient    Risks, benefits, and alternatives were discussed: yes      Risks discussed:  Bleeding, infection and pain    Alternatives discussed:  No treatment  Universal protocol:     Procedure explained and questions answered to patient or proxy's satisfaction: yes      Relevant documents present and verified: yes      Test results available: yes      Imaging studies available: yes      Required blood products, implants, devices, and special equipment available: yes      Site/side marked: no      Immediately prior to procedure, a time out was called: yes      Patient identity confirmed:  Verbally with patient, arm band and hospital-assigned identification number  Indications:     Indications:  Fertility testing  Pre-procedure details:     Skin preparation:  Povidone-iodine  Sedation:     Sedation type:  None  Anesthesia:     Anesthesia method:  None  Procedure specific details:      Done by this TRELL      Hysterosalpingogram (HSG) risks, benefits, alternatives, and personnel discussed with patient who agreed to proceed.    Procedural time out        Done in room where procedure done: Yes        Done just before starting procedure: Yes                                                 All members of procedural team involved in time-out: Yes                  Active communication used: Yes                                                         All team members agreed on procedure: Yes                                        Patient correctly identified by two identifiers: Yes                                  Correct side and site identified: Yes                                                     All needed special equipment/instruments available: Yes               Prior to the start of the procedure a time out  was taken and the following were verified: The identity of the patient using two patient identifiers.   Urine pregnancy test was performed and was negative.   Risks, benefits, and alternatives of the procedure were explained to the patient.  Informed consent was obtained.     The patient was placed in the dorsal lithotomy position and a sterile speculum was placed in the vagina. The cervix was sterilized with Betadine x 3. The anterior lip of the cervix was grasped with a single-tooth tenaculum. The (balloon/acorn) cannula was then placed in the cervix and secured to the tenaculum. The patient was positioned and images were taken with fluoroscopy as dye was inserted through the cannula. All instruments were then removed. The patient tolerated the procedure well and was discharged home the same day without complications.    Uterus:  normal contour without filling defects  Tubes:  bilateral patency with free spill of dye, normal tubal architecture noted, and no loculations present.  Based on these findings, my recommendation is: No further follow up required.    The patient was counseled regarding the above findings and images were reviewed with patient at the time of the exam.     Jody Joseph 06/17/25 8:17 AM          Post-procedure details:     Procedure completion:  Tolerated well, no immediate complications

## 2025-06-25 ENCOUNTER — APPOINTMENT (OUTPATIENT)
Dept: PRIMARY CARE | Facility: CLINIC | Age: 30
End: 2025-06-25
Payer: COMMERCIAL

## 2025-06-25 VITALS
BODY MASS INDEX: 26.32 KG/M2 | TEMPERATURE: 97.7 F | HEART RATE: 89 BPM | SYSTOLIC BLOOD PRESSURE: 120 MMHG | OXYGEN SATURATION: 98 % | DIASTOLIC BLOOD PRESSURE: 78 MMHG | WEIGHT: 139.4 LBS | HEIGHT: 61 IN

## 2025-06-25 DIAGNOSIS — G43.909 MIGRAINE WITHOUT STATUS MIGRAINOSUS, NOT INTRACTABLE, UNSPECIFIED MIGRAINE TYPE: ICD-10-CM

## 2025-06-25 DIAGNOSIS — Z00.00 ROUTINE GENERAL MEDICAL EXAMINATION AT A HEALTH CARE FACILITY: Primary | ICD-10-CM

## 2025-06-25 DIAGNOSIS — E55.9 VITAMIN D DEFICIENCY: ICD-10-CM

## 2025-06-25 DIAGNOSIS — F41.9 ANXIETY: ICD-10-CM

## 2025-06-25 DIAGNOSIS — G43.809 OTHER MIGRAINE WITHOUT STATUS MIGRAINOSUS, NOT INTRACTABLE: ICD-10-CM

## 2025-06-25 DIAGNOSIS — I47.19 ATRIAL PAROXYSMAL TACHYCARDIA: ICD-10-CM

## 2025-06-25 DIAGNOSIS — Z13.220 SCREENING, LIPID: ICD-10-CM

## 2025-06-25 PROCEDURE — 3008F BODY MASS INDEX DOCD: CPT | Performed by: INTERNAL MEDICINE

## 2025-06-25 PROCEDURE — 99213 OFFICE O/P EST LOW 20 MIN: CPT | Performed by: INTERNAL MEDICINE

## 2025-06-25 PROCEDURE — 1036F TOBACCO NON-USER: CPT | Performed by: INTERNAL MEDICINE

## 2025-06-25 PROCEDURE — 99395 PREV VISIT EST AGE 18-39: CPT | Performed by: INTERNAL MEDICINE

## 2025-06-25 RX ORDER — RIZATRIPTAN BENZOATE 10 MG/1
10 TABLET ORAL ONCE AS NEEDED
Qty: 9 TABLET | Refills: 2 | Status: SHIPPED | OUTPATIENT
Start: 2025-06-25 | End: 2025-07-25

## 2025-06-25 RX ORDER — ONDANSETRON 8 MG/1
8 TABLET, FILM COATED ORAL EVERY 8 HOURS PRN
Qty: 30 TABLET | Refills: 3 | Status: SHIPPED | OUTPATIENT
Start: 2025-06-25 | End: 2025-08-04

## 2025-06-25 RX ORDER — ESCITALOPRAM OXALATE 5 MG/1
5 TABLET ORAL DAILY
Qty: 90 TABLET | Refills: 3 | Status: SHIPPED | OUTPATIENT
Start: 2025-06-25

## 2025-06-25 SDOH — ECONOMIC STABILITY: FOOD INSECURITY: WITHIN THE PAST 12 MONTHS, YOU WORRIED THAT YOUR FOOD WOULD RUN OUT BEFORE YOU GOT MONEY TO BUY MORE.: NEVER TRUE

## 2025-06-25 SDOH — ECONOMIC STABILITY: FOOD INSECURITY: WITHIN THE PAST 12 MONTHS, THE FOOD YOU BOUGHT JUST DIDN'T LAST AND YOU DIDN'T HAVE MONEY TO GET MORE.: NEVER TRUE

## 2025-06-25 ASSESSMENT — LIFESTYLE VARIABLES
HOW OFTEN DO YOU HAVE A DRINK CONTAINING ALCOHOL: MONTHLY OR LESS
AUDIT-C TOTAL SCORE: 1
HOW MANY STANDARD DRINKS CONTAINING ALCOHOL DO YOU HAVE ON A TYPICAL DAY: 1 OR 2
HOW OFTEN DO YOU HAVE SIX OR MORE DRINKS ON ONE OCCASION: NEVER
SKIP TO QUESTIONS 9-10: 1

## 2025-06-25 ASSESSMENT — PATIENT HEALTH QUESTIONNAIRE - PHQ9
1. LITTLE INTEREST OR PLEASURE IN DOING THINGS: NOT AT ALL
2. FEELING DOWN, DEPRESSED OR HOPELESS: NOT AT ALL
SUM OF ALL RESPONSES TO PHQ9 QUESTIONS 1 & 2: 0

## 2025-06-25 ASSESSMENT — PAIN SCALES - GENERAL: PAINLEVEL_OUTOF10: 0-NO PAIN

## 2025-06-25 NOTE — PROGRESS NOTES
"Chief Complaint   Patient presents with    Annual Exam     Pt here for AWV       29 y.o. for AWV  Last visit 06/2024. Working with gyn-trying to conceive. Planning IUI fall.   Migraine-triggered by sunlight-episode while on vacation-had a lot of vomiting. Usually takes nsaid prn. Knows to avoid when she does become pregnant. Still working at [employer] and enjoys it.   Dentist saw something right jaw-not sure what it is possible wisdom tooth. They recommended biopsy. Referred to oral surgeon but did not take her insurance. Referred to several others but did not hear back from them. She did have routine f/up with dentist and area was the same on imaging. She thinks it has been the same for several years.       PAST MEDICAL HISTORY  Eczema.   Anxiety  Palpitations, tachycardia zio patch 2021  Migraines, ocular migraine (rare ocular migraine)      Allergy: wellbutrin, adverse reaction- palpitations     SOCIAL HISTORY  nonsmoker  . no children.   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     FAMILY HISTORY  mother alive Hashimoto's thyroidism, diverticulitis, basal cell ca.   Father alive depression.    Blood pressure 120/78, pulse 89, temperature 36.5 °C (97.7 °F), height 1.549 m (5' 1\"), weight 63.2 kg (139 lb 6.4 oz), SpO2 98%.  Body mass index is 26.34 kg/m².    Physical Examination  Physical Exam  Vitals reviewed.   Constitutional:       General: She is not in acute distress.  HENT:      Head: Normocephalic and atraumatic.      Right Ear: Tympanic membrane, ear canal and external ear normal. There is no impacted cerumen.      Left Ear: Tympanic membrane, ear canal and external ear normal. There is no impacted cerumen.      Mouth/Throat:      Pharynx: Oropharynx is clear. No oropharyngeal exudate or posterior oropharyngeal erythema.   Eyes:      Extraocular Movements: Extraocular movements intact.      Conjunctiva/sclera: Conjunctivae normal.      Pupils: Pupils are " "equal, round, and reactive to light.   Neck:      Vascular: No carotid bruit.   Cardiovascular:      Rate and Rhythm: Normal rate and regular rhythm.      Pulses: Normal pulses.      Heart sounds: Normal heart sounds. No murmur heard.  Pulmonary:      Effort: Pulmonary effort is normal. No respiratory distress.      Breath sounds: Normal breath sounds. No wheezing or rales.   Abdominal:      General: Bowel sounds are normal. There is no distension.      Palpations: Abdomen is soft. There is no mass.      Tenderness: There is no abdominal tenderness.   Musculoskeletal:      Right lower leg: No edema.      Left lower leg: No edema.   Lymphadenopathy:      Cervical: No cervical adenopathy.   Skin:     Coloration: Skin is not jaundiced.      Findings: No rash.   Neurological:      Mental Status: She is alert and oriented to person, place, and time.      Cranial Nerves: No cranial nerve deficit.      Sensory: No sensory deficit.      Motor: No weakness.      Comments: Babinski negative     No results found for: \"HGBA1C\"  Lab Results   Component Value Date    GLUCOSE 98 09/06/2024    CALCIUM 9.1 09/06/2024     09/06/2024    K 4.5 09/06/2024    CO2 29 09/06/2024     09/06/2024    BUN 11 09/06/2024    CREATININE 0.77 09/06/2024     Lab Results   Component Value Date    ALT 15 09/06/2024    AST 14 09/06/2024    ALKPHOS 60 09/06/2024    BILITOT 0.6 09/06/2024     Lab Results   Component Value Date    WBC 7.8 09/06/2024    HGB 13.6 09/06/2024    HCT 41.2 09/06/2024    MCV 90 09/06/2024     09/06/2024     Lab Results   Component Value Date    CHOL 164 09/06/2024    CHOL 197 06/14/2023     Lab Results   Component Value Date    HDL 57.6 09/06/2024    HDL 67.4 06/14/2023     Lab Results   Component Value Date    LDLCALC 96 09/06/2024     Lab Results   Component Value Date    TRIG 52 09/06/2024    TRIG 94 06/14/2023     No components found for: \"CHOLHDL\"      ASSESSMENT/PLAN  AWV    Cognition/Memory if 65 " or above n/a  Hepatitis C screen (18-79) done 09/2024  HIV (18-64, once)  screen done 09/2024  Women: mammogram: n/a  PAP per gyn Dr Malave/Silva utd 03/2024 HPV negative.   Dexa: start age 65  Colon cancer screening 45 and older: n/a  Sees derm yearly for skin cancer screen (+family history mother, maternal aunt, paternal aunt)    1. Atrial paroxysmal tachycardia  - Comprehensive Metabolic Panel; Future  - Comprehensive Metabolic Panel    2. Routine general medical examination at a health care facility (Primary)    3. Anxiety  - escitalopram (Lexapro) 5 mg tablet; Take 1 tablet (5 mg) by mouth once daily.  Dispense: 90 tablet; Refill: 3    4. Migraine without status migrainosus, not intractable, unspecified migraine type  Rx zofran to use prn. Can use even when not nauseated to help with migraine. Category b so can use when pregnant  Maxalt to use prn but advised to not use when she is pregnant.   - ondansetron (Zofran) 8 mg tablet; Take 1 tablet (8 mg) by mouth every 8 hours if needed for nausea (or migraine).  Dispense: 30 tablet; Refill: 3  - rizatriptan (Maxalt) 10 mg tablet; Take 1 tablet (10 mg) by mouth 1 time if needed for migraine. May repeat in 2 hours if unresolved. Do not exceed 30 mg in 24 hours.  Dispense: 9 tablet; Refill: 2    5. Vitamin D deficiency  - Vitamin D 25-Hydroxy,Total (for eval of Vitamin D levels); Future  - Vitamin D 25-Hydroxy,Total (for eval of Vitamin D levels)    6. Other migraine without status migrainosus, not intractable    - CBC; Future  - Comprehensive Metabolic Panel; Future  - CBC  - Comprehensive Metabolic Panel    7. Screening, lipid  - Lipid Panel; Future  - Lipid Panel      Current Outpatient Medications on File Prior to Visit   Medication Sig Dispense Refill    cetirizine (ZyrTEC) 10 mg capsule Take 1 capsule (10 mg) by mouth once daily.      cholecalciferol (Vitamin D-3) 25 MCG (1000 UT) capsule Take 1 capsule (25 mcg) by mouth once daily.      escitalopram (Lexapro)  5 mg tablet TAKE 1 TABLET BY MOUTH EVERY DAY 90 tablet 1    multivitamin (Daily Multi-Vitamin) tablet Take 1 tablet by mouth once daily.       No current facility-administered medications on file prior to visit.

## 2025-08-05 ENCOUNTER — TELEMEDICINE (OUTPATIENT)
Dept: ENDOCRINOLOGY | Facility: CLINIC | Age: 30
End: 2025-08-05
Payer: COMMERCIAL

## 2025-08-05 VITALS — HEIGHT: 61 IN | WEIGHT: 138 LBS | BODY MASS INDEX: 26.06 KG/M2

## 2025-08-05 DIAGNOSIS — N97.9 FEMALE INFERTILITY: Primary | ICD-10-CM

## 2025-08-05 PROCEDURE — 1036F TOBACCO NON-USER: CPT

## 2025-08-05 PROCEDURE — 3008F BODY MASS INDEX DOCD: CPT

## 2025-08-05 PROCEDURE — 99214 OFFICE O/P EST MOD 30 MIN: CPT

## 2025-08-05 ASSESSMENT — PATIENT HEALTH QUESTIONNAIRE - PHQ9
SUM OF ALL RESPONSES TO PHQ9 QUESTIONS 1 AND 2: 0
1. LITTLE INTEREST OR PLEASURE IN DOING THINGS: NOT AT ALL
2. FEELING DOWN, DEPRESSED OR HOPELESS: NOT AT ALL

## 2025-08-05 ASSESSMENT — PAIN SCALES - GENERAL: PAINLEVEL_OUTOF10: 0-NO PAIN

## 2025-08-05 NOTE — PROGRESS NOTES
Virtual or Telephone Consent: An interactive audio and video telecommunication system which permits real time communications between the patient (at the originating site) and provider (at the distant site) was utilized to provide this telehealth service  MD reviewed, Authorization status not noted.    Follow Up Visit HPI/Boarding Pass visit    Patient is a 29 y.o.  female with primary infertility x 3 yrs, male factor and spouse with ED- seeing Dr. COPELAND,  presenting today for follow up visit to discuss next steps- IUI, has 3 frozen sperm samples for IVF- sperm has greatly improved and optimized for IUI.     PRIOR EVALUATION / TREATMENT: no      Prior Labs  Lab Results    Date Done      AMH: 7.015 (Ref range: 0.176 - 11.705 ng/mL) 2024   TSH: 1.48 (Ref range: 0.44 - 3.98 mIU/L) 2024   PRL: No results found for requested labs within last 1825 days. No results found for requested labs within last 1825 days.   Testosterone: No results found for requested labs within last 1825 days. No results found for requested labs within last 1825 days.   DHEAS: No results found for requested labs within last 1825 days. No results found for requested labs within last 1825 days.   FSH: No results found for requested labs within last 1825 days. No results found for requested labs within last 1825 days.   17 OHP: No results found for requested labs within last 1825 days. No results found for requested labs within last 1825 days.   HgbA1c: No results found for requested labs within last 1825 days. No results found for requested labs within last 1825 days.   Hepatitis B surface antigen: Nonreactive (Ref range: Nonreactive) 2024   Hepatitis C antibody: Nonreactive (Ref range: Nonreactive) 2024   HIV ½ Antigen Antibody screen with reflex: Nonreactive (Ref range: Nonreactive) 2024   Syphilis screening with reflex: Negative 2024   GC: Negative (Ref range: Negative) 2024   CT: Negative (Ref range:  Negative) 2024   Type and Screen: O 2024   Rh: POS 2024   Antibody: NEG (Ref range: ) 2024   Rubella: Positive (Ref range: Negative) 2024   Varicella: Positive (A; Ref range: Negative) 2024   Hemoglobin: No results found for requested labs within last 1825 days. No results found for requested labs within last 1825 days.   Hematocrit: No results found for requested labs within last 1825 days. No results found for requested labs within last 1825 days.   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 Carriers generally do not experience symptoms. G869R may be associated with a late-onset form of Kristofer disease. Spouse is negative.        Hysteroscopy 2025 for IVF: Findings:   Cavity: Smooth cavity no lesions noted  Ostia: Bilateral tubal ostia visualized  Additional Notes: Normal endometrial cavity    Hysterosalpingogram: FL HYSTEROSALPINGOGRAM (2025): maxime tubal patency    Saline Infused Sonography: no    GYN Pelvic Ultrasound: US PELVIS TRANSVAGINAL (10/20/2024): Anteverted uterus with an endometrial lining that measures as below. The ovaries are normal in appearance bilaterally.     Treatment to date:   Fertility Cycles       Cycle Name Treatment Start Date Type Outcome    Cycle Created on 2025  ICSI, Retrieval, Cryopreservation (Embryo) Active          Relationship Status:  , 6.5 yrs        OB Hx:      OB History            0    Para   0    Term   0       0    AB   0    Living   0           SAB   0    IAB   0    Ectopic   0    Multiple   0    Live Births   0                   GYN HISTORY   Have you ever been diagnosed with a sexually transmitted disease? No  Have you ever had Pelvic Inflammatory Disease? No  Have you had an abnormal PAP smear? No  Date & Result of last PAP smear: 2021- negative- HPV done  "3-4-2025- negative  Have you ever had an abnormal Mammogram? No  Date & result of your last mammogram:  NA  Do you have pelvic pain? No  How many times per week do you have intercourse? 1-2 times during fertile window  Do you have pain with intercourse? No  Do you use lubricants with intercourse? None  Do you have pain with bowel movements? No  Do you have pain with a full bladder? No     MENSTRUAL HISTORY  LMP: 2025  Cycle length: every 29-30 days  Describe your bleedin-6 days Average, 1st 3 days heavier  Dysmenorrhea: no     ENDOCRINE/INFERTILITY HISTORY  Duration of infertility: 3 yrs  Coital Activity/week: 1-2 times during fertile window  Nipple Discharge: No  Vision changes: No  Headaches: yes-  had migraines with aura on OCPs, has had some HA's off OCP also, but not with aura, Excess hair growth: No  Excessive hair loss: No  Acne: No  Oily skin: No  Recent weight change  Weight gain: No  Weight loss: No  Exercise more than 3 times a week: No    Medical History[1]  Surgical History[2]  Medications Ordered Prior to Encounter[3]    BMI:   BMI Readings from Last 1 Encounters:   25 26.07 kg/m²     VITALS:  Ht (!) 1.549 m (5' 1\")   Wt 62.6 kg (138 lb)   LMP 2025 (Exact Date)   BMI 26.07 kg/m²   LMP: Patient's last menstrual period was 2025 (exact date).    PARTNER HISTORY  Partner Name: Chapo Craig  Partner : [id number]  Partner email: [email or web address]  Occupation:   Prior fertility history: No children. Decreased ability to be sexually stimulated. Difficult and usually unable to ejaculate. Difficulty maintaining erection. Inability to orgasm. Decreased sex drive.  Decreased pleasurable sensation of penis during sex with unable to ejaculate.  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 Carriers generally do not experience symptoms. 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 Carriers generally do not experience symptoms. In the absence of a known personal and/or family history of inflammatory disease, the clinical significance of MEFV mutation status is uncertain. Spouse is negative.  SA:see below  SA Results:  see below  Scrotal Ultrasound: 2024: IMPRESSION:  Small right hydrocele with small bilateral varicoceles.  Saw DR. COPELAND on 2024: summary:  Semen analysis with strict morphology- has not done yet  Fertility Panel:  serum Estradiol, FSH + LH, serum Prolactin, Testosterone - all normal 2024     #delayed orgasm both with masturbation and sex, lifelong  -fert/ed panel  -cialis 5mg daily - med counseling done- he took x 4-5 days and stopped due to side effects, muscle aches  -Dr. Jose Daniel PALACIO with DR. COPELAND on 7-:   Assessment/Plan  #infertility, bilateral varicoceles  - SA x4 came back essentially normal  -normal sperm count and optimized from male fertility standpoint     #ED  -sildenafil 100mg prn     #delayed orgasm both with masturbation and sex, lifelong --improving significantly on meds as he is able to ejaculate with masturbation  -increase Dostinex to 1mg twice weekly - sent to pharmacy     Fu in 3 months-     3 Sperm Frozen Samples all normal:  3-: vol 3.60 ml, conc 185 mill, mot 41%, .75 mil  3-: vol 4.60 ml, 114 mill, mot 68%,  mil  3-: vol 5.90 ml, conc 227 mill, mot 27%,  mill      ASSESSMENT   29 y.o.  female with  primary infertility x 3 yrs, suspected ovulation and  "the following pertinent medical issues: male factor/ED- spouse last seen by Dr. COPELAND in Urology 7-, has 3 frozen sperm samples for IVF- sperm has greatly improved and optimized for IUI & IVF. Had IVF consult 12-      COUNSELING  Discussed use of luteal phase progesterone. Micronized progesterone (Prometrium) is an oral pill used vaginally to increase absorption to the uterus and decrease systemic side effects. It should be started 3 days post IUI, 4 days post + OPK, or 5 days post HCG trigger. A urine pregnancy test should be taken approximately two weeks after ovulation, if + stay on the progesterone and call the office, if negative stop the progesterone or it may delay the onset of menses.    We discussed that Letrozole is used for ovulation induction and that recent studies have found letrozole is superior to Clomid for ovulation induction in patients with PCOS. We discussed common side effects of Letrozole including headaches, vision changes, hot flashes, mood changes, and breast tenderness.  Letrozole is NOT FDA approved for the treatment of infertility and was initially associated with (in some studies) a higher risk of congenital anomalies, although this was not found in a more recent large study of patients taking Letrozole for unexplained infertility. Patients must take a pregnancy test prior to starting Letrozole each month. We discussed that there is an increased risk of multiple gestation with Letrozole approximately 10% for twins and less than 1% for triplets.  Counseled regarding option of selective reduction for higher order multiples.    MONITORED CYCLE  You will take your oral fertility medications as prescribed cycle days 3-7. When you start the medications, you can also call the office to get set up for a \"follicle scan.\" Follicle scans are usually done between cycle days 10-12. It is a vaginal ultrasound. We typically do not perform follicle scans on the weekends, so the schedulers " will help you find an appropriate appointment time. The follicle scans are done in the morning and will give us information on how your eggs are growing and how thick the lining of the uterus is. Once the follicles (eggs) are big enough (ripe or mature) we will trigger their release with a one time subcutaneous injection- the nurses will order this for you and show you how to do it when you are here for the follicle scan. We cannot trigger small eggs, only ones that have grown to mature size. The IUI is usually done about 36 hours after the trigger. The nurse will also help you schedule that.      Routine Testing  Fertility Center  STDs Within 1 year   Genetic carrier Waiver/Completed   T&S Within 1 year   AMH Within 1 year   TSH Within 1 year   Rubella/Varicella Within 5 years     BMI Testing  Fertility Center  CBC Within 1 year   CMP Within 1 year   HgbA1c Within 1 year   Mag, Phos, Vit D <18 Within 1 year   MFM > 40  REQ   Wt loss consult > 40 OPT     PLAN  Financial Consult for IUI- no coverage for IUI/monitoring  Will need to update annual labs in September 2025: AMH, T&S, STDs, & TSH  Engaged MD  Take prenatal vitamins, vitamin D 2000 IUs daily  Discussed that treatment cannot proceed until checklist items are complete.   Additional testing for BMI < 18 or > 40: NA.  Chart to primary nurse for care coordination and patient check list/education.    MD Completion:  Ectopic Risk: No  Medically Complex: No    Intimate Exam Performed: No, an intimate exam was not performed at this encounter.     Partner:  Will need to freeze a couple samples for IUI, currently has 3 vials frozen for IVF- unsure how it would look with gradient     Outstanding boarding pass items:   Partner needs to freeze sperm samples for IUI      Follow up Plan:  Letrozole 2.5 mg days 3-7/monitoring/Ovidrel Trigger/IUI + LP Prometrium 100 mg BID PV to start 3 days after IUI x 3 cycles  Partner needs to freeze sperm sample for IUI- checked with  andrology   If no success or sperm samples not optimal, consider IVF again      María Elena Nino CNP 25 10:21 AM          [1]   Past Medical History:  Diagnosis Date    Anxiety     Irregular menstrual cycle     Migraines    [2]   Past Surgical History:  Procedure Laterality Date    OTHER SURGICAL HISTORY  2021    Rudy tooth extraction   [3]   Current Outpatient Medications on File Prior to Visit   Medication Sig Dispense Refill    cetirizine (ZyrTEC) 10 mg capsule Take 1 capsule (10 mg) by mouth once daily.      cholecalciferol (Vitamin D-3) 25 MCG (1000 UT) capsule Take 1 capsule (25 mcg) by mouth once daily.      escitalopram (Lexapro) 5 mg tablet Take 1 tablet (5 mg) by mouth once daily. 90 tablet 3    multivitamin (Daily Multi-Vitamin) tablet Take 1 tablet by mouth once daily.      [] ondansetron (Zofran) 8 mg tablet Take 1 tablet (8 mg) by mouth every 8 hours if needed for nausea (or migraine). 30 tablet 3    rizatriptan (Maxalt) 10 mg tablet Take 1 tablet (10 mg) by mouth 1 time if needed for migraine. May repeat in 2 hours if unresolved. Do not exceed 30 mg in 24 hours. 9 tablet 2     No current facility-administered medications on file prior to visit.

## 2025-08-05 NOTE — PROGRESS NOTES
Boarding Pass Oral TIC/IUI    Age: 29 y.o.    Provider: María Elena Nino CNP  Primary RN: Kendra Sullivan  Reasons for Treatment: primary infertility x 3 yrs, male factor and spouse with ED- seeing Dr. COPELAND,  Last BMI  25 : 26.07 kg/m²       Medical History[1]    Date Done Consultation Results/Comments   2025 Medication Protocol   Fertility Plan Update:Follow up Plan:  Letrozole 2.5 mg days 3-7/monitoring/Ovidrel Trigger/IUI + LP Prometrium 100 mg BID PV to start 3 days after IUI x 3 cycles  Partner needs to freeze sperm sample for IUI- checked with andrology   If no success or sperm samples not optimal, consider IVF again       Authorization to Share []       Procedure Order Placed []     Date Done Female Labs Results/Comments   2024 T&S (Q 1 Year) ABO: O  Rh: POS  Antibody: NEG     2024 TSH 1.48   2024 AMH 7.015   2024 Hep B sAg Nonreactive   2024 Hep C AB Nonreactive   2024 HIV Nonreactive   2024 Syphilis Nonreactive   2024 GC/CT GC: Negative  CT: Negative   2024 Rubella (Q 5 Years) Positive   2024 Varicella (Q 5 Years) Positive (A)   10/11/2024 Carrier Screening [email or web address] 2bP: *[employer] CARRIER SCREEN (10/11/2024):   Authorization completed  Pos Kristofer Disease- spouse is negative   2025 Hysteroscopy for IVF Findings:   Cavity: Smooth cavity no lesions noted  Ostia: Bilateral tubal ostia visualized  Additional Notes: Normal endometrial cavity   10- Pelvic ultrasound Anteverted uterus with an endometrial lining that measures as below. The ovaries are normal in appearance bilaterally.    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 patient & partner are is cleared for fertility treatment:   Letrozole 2.5 mg days 3-7/monitoring/Ovidrel Trigger/IUI + LP Prometrium 100 mg BID PV to start 3 days after IUI x 3 cycles  Partner needs to freeze sperm sample for IUI- checked with andrology   If no success or sperm samples not optimal, consider IVF again    María Elena Nino CNP 08/05/25 2:54 PM        [1]   Past Medical History:  Diagnosis Date    Anxiety     Irregular menstrual cycle     Migraines

## 2026-07-01 ENCOUNTER — APPOINTMENT (OUTPATIENT)
Dept: PRIMARY CARE | Facility: CLINIC | Age: 31
End: 2026-07-01
Payer: COMMERCIAL